# Patient Record
Sex: FEMALE | Race: ASIAN | Employment: UNEMPLOYED | URBAN - METROPOLITAN AREA
[De-identification: names, ages, dates, MRNs, and addresses within clinical notes are randomized per-mention and may not be internally consistent; named-entity substitution may affect disease eponyms.]

---

## 2019-12-18 ENCOUNTER — OFFICE VISIT (OUTPATIENT)
Dept: NEUROLOGY | Age: 29
End: 2019-12-18

## 2019-12-18 VITALS
DIASTOLIC BLOOD PRESSURE: 78 MMHG | BODY MASS INDEX: 47.2 KG/M2 | SYSTOLIC BLOOD PRESSURE: 114 MMHG | OXYGEN SATURATION: 98 % | HEART RATE: 114 BPM | HEIGHT: 61 IN | RESPIRATION RATE: 16 BRPM | WEIGHT: 250 LBS

## 2019-12-18 DIAGNOSIS — R51.9 WORSENING HEADACHES: Primary | ICD-10-CM

## 2019-12-18 DIAGNOSIS — G43.709 CHRONIC MIGRAINE W/O AURA W/O STATUS MIGRAINOSUS, NOT INTRACTABLE: ICD-10-CM

## 2019-12-18 RX ORDER — TOPIRAMATE 50 MG/1
TABLET, FILM COATED ORAL
Qty: 60 TAB | Refills: 5 | Status: SHIPPED | OUTPATIENT
Start: 2019-12-18 | End: 2020-10-16

## 2019-12-18 NOTE — PROGRESS NOTES
Ms. Velasquez presents as a new patient for evaluation of daily headaches. Depression screening done on patient.

## 2019-12-18 NOTE — PROGRESS NOTES
Chief Complaint   Patient presents with    Headache       Referred by: Dr. Hailee Hernandez      HPI    43-year-old woman here for headaches. She has had headaches for about 10 years diffuse throbbing pain occurring daily with severe escalations consisting of nausea vomiting photosensitivity. She has to go to the ER. She is taking Tylenol almost daily. Sleep is okay. No vision changes numbness or weakness. Her daughter has migraines. Review of Systems   Eyes: Positive for photophobia. Negative for double vision. Gastrointestinal: Positive for nausea. Neurological: Positive for headaches. All other systems reviewed and are negative.       Past Medical History:   Diagnosis Date    Depression     Other ill-defined conditions(799.17)     migraines    Postpartum depression     After second baby -mild- problems with mother in law- resolved     Family History   Problem Relation Age of Onset    Hypertension Paternal Grandmother      Social History     Socioeconomic History    Marital status:      Spouse name: Not on file    Number of children: Not on file    Years of education: Not on file    Highest education level: Not on file   Occupational History    Not on file   Social Needs    Financial resource strain: Not on file    Food insecurity:     Worry: Not on file     Inability: Not on file    Transportation needs:     Medical: Not on file     Non-medical: Not on file   Tobacco Use    Smoking status: Never Smoker    Smokeless tobacco: Never Used   Substance and Sexual Activity    Alcohol use: No    Drug use: No    Sexual activity: Yes     Partners: Male   Lifestyle    Physical activity:     Days per week: Not on file     Minutes per session: Not on file    Stress: Not on file   Relationships    Social connections:     Talks on phone: Not on file     Gets together: Not on file     Attends Tenriism service: Not on file     Active member of club or organization: Not on file     Attends meetings of clubs or organizations: Not on file     Relationship status: Not on file    Intimate partner violence:     Fear of current or ex partner: Not on file     Emotionally abused: Not on file     Physically abused: Not on file     Forced sexual activity: Not on file   Other Topics Concern    Not on file   Social History Narrative    Not on file     Current Outpatient Medications   Medication Sig    topiramate (TOPAMAX) 50 mg tablet 1/2 tab twice daily for 5 days then increase to 1 tab twice daily  Indications: Migraine Prevention    oxyCODONE-acetaminophen (PERCOCET) 5-325 mg per tablet Take 2 Tabs by mouth every four (4) hours as needed for Pain.  ibuprofen (MOTRIN) 600 mg tablet Take 1 Tab by mouth every six (6) hours as needed for Pain.  prenatal multivitamin 90-1-50 mg tablet Take 1 Tab by mouth daily. Indications: PREGNANCY     No current facility-administered medications for this visit. No Known Allergies      Neurologic Exam     Mental Status   Oriented to person, place, and time. Cranial Nerves   Cranial nerves II through XII intact. Motor Exam   Muscle bulk: normal    Strength   Strength 5/5 throughout. Sensory Exam   Light touch normal.     Gait, Coordination, and Reflexes     Gait  Gait: normal    Coordination   Finger to nose coordination: normal    Physical Exam   Constitutional: She is oriented to person, place, and time. She appears well-developed and well-nourished. Cardiovascular: Normal rate. Pulmonary/Chest: Effort normal.   Neurological: She is oriented to person, place, and time. She has normal strength. She has a normal Finger-Nose-Finger Test. Gait normal.   Skin: Skin is warm and dry. Psychiatric: Her behavior is normal.   Vitals reviewed.     Visit Vitals  /78   Pulse (!) 114   Resp 16   Ht 5' 1\" (1.549 m)   Wt 113.4 kg (250 lb)   SpO2 98%   BMI 47.24 kg/m²       Lab Results   Component Value Date/Time    WBC 10.2 04/26/2013 10:50 AM    HGB 12.4 04/26/2013 10:50 AM    HCT 37.6 04/26/2013 10:50 AM    PLATELET 341 (L) 73/51/5027 10:50 AM    MCV 89.3 04/26/2013 10:50 AM     Lab Results   Component Value Date/Time    PLATELET 531 (L) 59/27/0948 10:50 AM          Assessment and Plan   Diagnoses and all orders for this visit:    1. Worsening headaches  -     CT HEAD WO CONT; Future    2. Chronic migraine w/o aura w/o status migrainosus, not intractable    Other orders  -     topiramate (TOPAMAX) 50 mg tablet; 1/2 tab twice daily for 5 days then increase to 1 tab twice daily  Indications: Migraine Prevention      51-year-old woman with daily headache with migrainous features with severe escalations. No red flags on exam or in the history. However she has had headaches for 10 years daily and has never had any screening imaging. Check head CT. Start Topamax twice daily, side effects have been discussed in detail. Keep a headache log. We will see her in follow-up. I reviewed and decided to continue the current medications. This clinical note was dictated with an electronic dictation software that can make unintentional errors. If there are any questions, please contact me directly for clarification. A notice of this visit/encounter being completed has been sent electronically to the patient's PCP and/or referring provider.      2 [facility], [facility] [address]  Diplomate NATALYA

## 2019-12-18 NOTE — PATIENT INSTRUCTIONS
PRESCRIPTION REFILL POLICY [facility] Statement to Patients April 1, 2014 In an effort to ensure the large volume of patient prescription refills is processed in the most efficient and expeditious manner, we are asking our patients to assist us by calling your Pharmacy for all prescription refills, this will include also your  Mail Order Pharmacy. The pharmacy will contact our office electronically to continue the refill process. Please do not wait until the last minute to call your pharmacy. We need at least 48 hours (2days) to fill prescriptions. We also encourage you to call your pharmacy before going to  your prescription to make sure it is ready. With regard to controlled substance prescription refill requests (narcotic refills) that need to be picked up at our office, we ask your cooperation by providing us with at least 72 hours (3days) notice that you will need a refill. We will not refill narcotic prescription refill requests after 4:00pm on any weekday, Monday through Thursday, or after 2:00pm on Fridays, or on the weekends. We encourage everyone to explore another way of getting your prescription refill request processed using [employer], our patient web portal through our electronic medical record system. [employer] is an efficient and effective way to communicate your medication request directly to the office and  downloadable as an debo on your smart phone . [employer] also features a review functionality that allows you to view your medication list as well as leave messages for your physician. Are you ready to get connected? If so please review the attatched instructions or speak to any of our staff to get you set up right away! Thank you so much for your cooperation. Should you have any questions please contact our Practice Administrator. The Physicians and Staff,  [facility]

## 2019-12-18 NOTE — LETTER
12/18/19 Patient: Alf Velasquez YOB: 1990 Date of Visit: 12/18/2019 Cody Quintero Sachin Holt, [address] [facility] [address] [address] VIA Facsimile: [phone] Dear Marquise Drake. Sachin Holt MD, Thank you for referring Ms. Guillaume Velasquez to [address] [place] [address] for evaluation. My notes for this consultation are attached. If you have questions, please do not hesitate to call me. I look forward to following your patient along with you. Sincerely, 2 [facility], DO 
 
12/18/2019 Patient:  Alf Velasquez YOB: 1990 Date of Visit: 12/18/2019 Dear Marquise Drake. Sachin Holt, [address] [facility] [address] [facility] [address] VIA Facsimile: [phone] 
 : 
 
 
I was requested by Bettie Duarte MD to evaluate Ms. Nohemy Velasquez  for Chief Complaint Patient presents with  
 Headache [place] Muck I am recommending the following:  
 
Diagnoses and all orders for this visit: 
 
1. Worsening headaches -     CT HEAD WO CONT; Future 2. Chronic migraine w/o aura w/o status migrainosus, not intractable Other orders -     topiramate (TOPAMAX) 50 mg tablet; 1/2 tab twice daily for 5 days then increase to 1 tab twice daily  Indications: Migraine Prevention 
 
 
 
---------------------------------------------------------------------------------------------------------------------- Below is my encounter: Chief Complaint Patient presents with  
 Headache Referred by: Dr. Sachin Holt HPI 
 
31-year-old woman here for headaches. She has had headaches for about 10 years diffuse throbbing pain occurring daily with severe escalations consisting of nausea vomiting photosensitivity. She has to go to the ER. She is taking Tylenol almost daily. Sleep is okay. No vision changes numbness or weakness. Her daughter has migraines. Review of Systems Eyes: Positive for photophobia. Negative for double vision. Gastrointestinal: Positive for nausea. Neurological: Positive for headaches. All other systems reviewed and are negative. Past Medical History:  
Diagnosis Date  Depression  Other ill-defined conditions(119.89) migraines  Postpartum depression After second baby -mild- problems with mother in law- resolved Family History Problem Relation Age of Onset  Hypertension Paternal Grandmother Social History Socioeconomic History  Marital status:  Spouse name: Not on file  Number of children: Not on file  Years of education: Not on file  Highest education level: Not on file Occupational History  Not on file Social Needs  Financial resource strain: Not on file  Food insecurity:  
  Worry: Not on file Inability: Not on file  Transportation needs:  
  Medical: Not on file Non-medical: Not on file Tobacco Use  Smoking status: Never Smoker  Smokeless tobacco: Never Used Substance and Sexual Activity  Alcohol use: No  
 Drug use: No  
 Sexual activity: Yes  
  Partners: Male Lifestyle  Physical activity:  
  Days per week: Not on file Minutes per session: Not on file  Stress: Not on file Relationships  Social connections:  
  Talks on phone: Not on file Gets together: Not on file Attends Holiness service: Not on file Active member of club or organization: Not on file Attends meetings of clubs or organizations: Not on file Relationship status: Not on file  Intimate partner violence:  
  Fear of current or ex partner: Not on file Emotionally abused: Not on file Physically abused: Not on file Forced sexual activity: Not on file Other Topics Concern  Not on file Social History Narrative  Not on file Current Outpatient Medications Medication Sig  
  topiramate (TOPAMAX) 50 mg tablet 1/2 tab twice daily for 5 days then increase to 1 tab twice daily  Indications: Migraine Prevention  oxyCODONE-acetaminophen (PERCOCET) 5-325 mg per tablet Take 2 Tabs by mouth every four (4) hours as needed for Pain.  ibuprofen (MOTRIN) 600 mg tablet Take 1 Tab by mouth every six (6) hours as needed for Pain.  prenatal multivitamin 90-1-50 mg tablet Take 1 Tab by mouth daily. Indications: PREGNANCY No current facility-administered medications for this visit. No Known Allergies Neurologic Exam  
 
Mental Status Oriented to person, place, and time. Cranial Nerves Cranial nerves II through XII intact. Motor Exam  
Muscle bulk: normal 
 
Strength Strength 5/5 throughout. Sensory Exam  
Light touch normal.  
 
Gait, Coordination, and Reflexes Gait Gait: normal 
 
Coordination Finger to nose coordination: normal 
 
Physical Exam  
Constitutional: She is oriented to person, place, and time. She appears well-developed and well-nourished. Cardiovascular: Normal rate. Pulmonary/Chest: Effort normal.  
Neurological: She is oriented to person, place, and time. She has normal strength. She has a normal Finger-Nose-Finger Test. Gait normal.  
Skin: Skin is warm and dry. Psychiatric: Her behavior is normal.  
Vitals reviewed. Visit Vitals /78 Pulse (!) 114 Resp 16 Ht 5' 1\" (1.549 m) Wt 113.4 kg (250 lb) SpO2 98% BMI 47.24 kg/m² Lab Results Component Value Date/Time WBC 10.2 04/26/2013 10:50 AM  
 HGB 12.4 04/26/2013 10:50 AM  
 HCT 37.6 04/26/2013 10:50 AM  
 PLATELET 009 (L) 42/23/7147 10:50 AM  
 MCV 89.3 04/26/2013 10:50 AM  
 
Lab Results Component Value Date/Time PLATELET 004 (L) 58/99/7836 10:50 AM  
  
 
 
Assessment and Plan Diagnoses and all orders for this visit: 
 
1. Worsening headaches -     CT HEAD WO CONT; Future 2. Chronic migraine w/o aura w/o status migrainosus, not intractable Other orders -     topiramate (TOPAMAX) 50 mg tablet; 1/2 tab twice daily for 5 days then increase to 1 tab twice daily  Indications: Migraine Prevention 49-year-old woman with daily headache with migrainous features with severe escalations. No red flags on exam or in the history. However she has had headaches for 10 years daily and has never had any screening imaging. Check head CT. Start Topamax twice daily, side effects have been discussed in detail. Keep a headache log. We will see her in follow-up. I reviewed and decided to continue the current medications. This clinical note was dictated with an electronic dictation software that can make unintentional errors. If there are any questions, please contact me directly for clarification. Thank you for giving me the opportunity to assist in the care of Ms. Bebeto Velaqsuez. If you have questions, please do not hesitate to contact me. Sincerely, [address] [facility], DO Neurologist 
Brain Injury Medicine Diplomate NATALYA

## 2019-12-26 ENCOUNTER — HOSPITAL ENCOUNTER (OUTPATIENT)
Dept: CT IMAGING | Age: 29
Discharge: HOME | End: 2019-12-26
Payer: COMMERCIAL

## 2019-12-26 DIAGNOSIS — R51.9 WORSENING HEADACHES: ICD-10-CM

## 2019-12-26 PROCEDURE — 70450 CT HEAD/BRAIN W/O DYE: CPT

## 2019-12-30 ENCOUNTER — TELEPHONE (OUTPATIENT)
Dept: NEUROLOGY | Age: 29
End: 2019-12-30

## 2019-12-30 NOTE — TELEPHONE ENCOUNTER
----- Message from Arron Yen sent at 12/30/2019  8:24 AM EST -----  Regarding: Dr Glenis Aase  Pt's  Jon Ballard is calling to see if it is ok for her to take her pain medicine, she has taken 2 Aleve and 2 Tylenol this morning already, please call pt at [phone]

## 2019-12-30 NOTE — TELEPHONE ENCOUNTER
Conferred with Dr. Mandie Lees.  Pt is to be taking her topamax BID (not prn). She will take her topamax tonight. (Not to take other pain relievers first), then again in the morning, and will stay on that regime moving forward.

## 2020-01-06 NOTE — TELEPHONE ENCOUNTER
----- Message from Kalyan Kerr sent at 1/6/2020 11:16 AM EST -----  Regarding: Dr. Sally Vaughn Message/Vendor Calls    Caller's first and last name: Chan Lebron- savannah      Reason for call: Inna Jones states pt says medicine \"generic Topamax 50 mg\" is not working.        Callback required yes/no and why: yes      Best contact number(s): [phone]      Details to clarify the request:      Kalyan Kerr

## 2020-01-06 NOTE — TELEPHONE ENCOUNTER
Pt's  calling back about cat scan that was taken at [facility], about 3 weeks ago. Pt is wanting the results.

## 2020-01-07 ENCOUNTER — TELEPHONE (OUTPATIENT)
Dept: NEUROLOGY | Age: 30
End: 2020-01-07

## 2020-01-07 NOTE — TELEPHONE ENCOUNTER
I spoke with pt. She has been on the 50mg BID for a week. She has no relief and has had a daily H/A for the past 2 weeks. She is also still taking Advil and Tylenol every day. Explained she needs to not continue with those OTC meds. She is asking for something to end this headache please.

## 2020-01-07 NOTE — TELEPHONE ENCOUNTER
----- Message from Mireya Brewer sent at 1/7/2020 12:44 PM EST -----  Regarding: Dr Gaspar/telephone  Patient return call    Caller's first and last name and relationship (if not the patient): Blake Velasquez, pt's       Best contact number(s):[phone]      Whose call is being returned:  nurse    Details to clarify the request:regarding questions he left on yesterday regarding her CT and mediations      Mireya Brewer

## 2020-01-08 RX ORDER — METHYLPREDNISOLONE 4 MG/1
TABLET ORAL
Qty: 1 DOSE PACK | Refills: 0 | Status: SHIPPED | OUTPATIENT
Start: 2020-01-08 | End: 2020-10-16

## 2020-01-08 RX ORDER — PROCHLORPERAZINE MALEATE 5 MG
TABLET ORAL
Qty: 10 TAB | Refills: 0 | Status: SHIPPED | OUTPATIENT
Start: 2020-01-08 | End: 2020-08-20 | Stop reason: ALTCHOICE

## 2020-02-07 ENCOUNTER — TELEPHONE (OUTPATIENT)
Dept: NEUROLOGY | Age: 30
End: 2020-02-07

## 2020-02-07 NOTE — TELEPHONE ENCOUNTER
----- Message from Cindy Navarrete sent at 2/7/2020  3:10 PM EST -----  Regarding: Dr. Kyle Sweeney Message/Vendor Calls    Caller's first and last name: Blake-      Reason for call: PRN medication for migraines is not working.        Callback required yes/no and why: yes      Best contact number(s): [phone]      Details to clarify the request:      Cindy Navarrete

## 2020-02-07 NOTE — TELEPHONE ENCOUNTER
The compazine/benadryl not helping with her headaches. Please advise if there is something else.       LOV  12/18/19  Pending  3/26/20

## 2020-02-10 RX ORDER — AMITRIPTYLINE HYDROCHLORIDE 25 MG/1
TABLET, FILM COATED ORAL
Qty: 30 TAB | Refills: 5 | Status: SHIPPED | OUTPATIENT
Start: 2020-02-10 | End: 2020-10-16

## 2020-02-10 NOTE — TELEPHONE ENCOUNTER
Okay  She already did a round of steroids. Amitriptyline at bedtime with the Topamax.     If still unrelenting may need to see UC for IV meds

## 2020-02-11 NOTE — TELEPHONE ENCOUNTER
----- Message from Eunice Howellpe sent at 2/11/2020  8:34 AM EST -----  Regarding: Dr. Arleth Wright returning a call regarding his wife.  Contact is [phone]

## 2020-08-20 DIAGNOSIS — G43.709 CHRONIC MIGRAINE W/O AURA W/O STATUS MIGRAINOSUS, NOT INTRACTABLE: Primary | ICD-10-CM

## 2020-08-20 RX ORDER — SUMATRIPTAN 50 MG/1
50 TABLET, FILM COATED ORAL
Qty: 9 TAB | Refills: 0 | Status: SHIPPED | OUTPATIENT
Start: 2020-08-20 | End: 2020-08-20

## 2020-08-20 RX ORDER — PROMETHAZINE HYDROCHLORIDE 12.5 MG/1
12.5 TABLET ORAL
Qty: 15 TAB | Refills: 0 | Status: SHIPPED | OUTPATIENT
Start: 2020-08-20 | End: 2020-10-16

## 2020-08-21 ENCOUNTER — TELEPHONE (OUTPATIENT)
Dept: NEUROLOGY | Facility: CLINIC | Age: 30
End: 2020-08-21

## 2020-08-21 NOTE — TELEPHONE ENCOUNTER
Hello Message    \"Pls cl. I spoke with the on-call earlier but no Rx has been called in.   I'm at the pharmacy now\"    Second Hello Message    \"wife having a bad headache and need to know if there is something else that she can take or do besides the medication she is taking\"

## 2020-08-21 NOTE — TELEPHONE ENCOUNTER
Patient called, verified  with , on HIPPA, advised of Dr. Monica Hernandez recommendation. He stated they would call back to schedule a follow up with her.

## 2020-08-26 ENCOUNTER — TELEPHONE (OUTPATIENT)
Dept: NEUROLOGY | Facility: CLINIC | Age: 30
End: 2020-08-26

## 2020-08-26 NOTE — TELEPHONE ENCOUNTER
Pt's  calling wanting to switch providers, pt would like to see Dr Sonia Langston, states his wife felt rushed, and would like a provider that will listen to her. Please advise.

## 2020-10-16 ENCOUNTER — OFFICE VISIT (OUTPATIENT)
Dept: NEUROLOGY | Facility: CLINIC | Age: 30
End: 2020-10-16
Payer: COMMERCIAL

## 2020-10-16 VITALS
BODY MASS INDEX: 46.44 KG/M2 | RESPIRATION RATE: 17 BRPM | WEIGHT: 246 LBS | DIASTOLIC BLOOD PRESSURE: 70 MMHG | HEIGHT: 61 IN | SYSTOLIC BLOOD PRESSURE: 120 MMHG | OXYGEN SATURATION: 98 % | HEART RATE: 76 BPM

## 2020-10-16 DIAGNOSIS — M54.2 CERVICALGIA: ICD-10-CM

## 2020-10-16 DIAGNOSIS — G43.009 MIGRAINE WITHOUT AURA AND WITHOUT STATUS MIGRAINOSUS, NOT INTRACTABLE: ICD-10-CM

## 2020-10-16 DIAGNOSIS — Z91.89 AT RISK FOR OBSTRUCTIVE SLEEP APNEA: ICD-10-CM

## 2020-10-16 DIAGNOSIS — G44.40 REBOUND HEADACHE: Primary | ICD-10-CM

## 2020-10-16 DIAGNOSIS — R06.83 SNORING: ICD-10-CM

## 2020-10-16 PROCEDURE — 99215 OFFICE O/P EST HI 40 MIN: CPT | Performed by: PSYCHIATRY & NEUROLOGY

## 2020-10-16 RX ORDER — AMITRIPTYLINE HYDROCHLORIDE 10 MG/1
10 TABLET, FILM COATED ORAL
Qty: 90 TAB | Refills: 1 | Status: SHIPPED | OUTPATIENT
Start: 2020-10-16

## 2020-10-16 NOTE — PROGRESS NOTES
Neurology Consult Note      HISTORY PROVIDED BY: patient    Chief Complaint:   Chief Complaint   Patient presents with    Headache      Subjective:    Joshua Velasquez is a 27 y.o. right handed female initially and last seen in the clinic by Dr. Siena Sow on 12/18/19 reporting a 10y h/o headaches, global throbbing daily HA with N/V/Photophobia. She was started on Topamax 50mg bid. [facility] 12/26/19 was normal.      She returns for f/u. This is my first visit with the pt. Pt reports pain in neck that radiates up and at other times the pain is global and feels like something is hitting her head. The second type is more severe associated with N/V/P/P. HAs are almost daily. She states she \"lives on tylenol and ibuprofen\" together daily up to every 3 hours. +Family h/o HAs in sister and brother. Not drinking enough water, doesn't keep up with it. No daily caffeine. She sleeps well, +snores, denies apneas, but wakes feeling like she is choking nightly. She stopped the Topamax b/c she tingling in her fingers, only took it for a few weeks. Previous testing:  [facility] 12/26/19 was normal    Past Medical History:   Diagnosis Date    Depression     Postpartum depression     After second baby -mild- problems with mother in law- resolved      History reviewed. No pertinent surgical history.    Social History     Socioeconomic History    Marital status:      Spouse name: Not on file    Number of children: Not on file    Years of education: Not on file    Highest education level: Not on file   Occupational History    Occupation: Doesn't work outside her home   Social Needs    Financial resource strain: Not on file    Food insecurity     Worry: Not on file     Inability: Not on file   [employer] needs     Medical: Not on file     Non-medical: Not on file   Tobacco Use    Smoking status: Never Smoker    Smokeless tobacco: Never Used   Substance and Sexual Activity    Alcohol use: No    Drug use: No    Sexual activity: Yes     Partners: Male   Lifestyle    Physical activity     Days per week: Not on file     Minutes per session: Not on file    Stress: Not on file   Relationships    Social connections     Talks on phone: Not on file     Gets together: Not on file     Attends [employer] service: Not on file     Active member of club or organization: Not on file     Attends meetings of clubs or organizations: Not on file     Relationship status: Not on file    Intimate partner violence     Fear of current or ex partner: Not on file     Emotionally abused: Not on file     Physically abused: Not on file     Forced sexual activity: Not on file   Other Topics Concern    Not on file   Social History Narrative    Lives in [place] with family     Family History   Problem Relation Age of Onset    Hypertension Paternal Grandmother     Heart Disease Mother     Hypertension Mother     Diabetes Mother     No Known Problems Father     Migraines Sister     Migraines Brother     No Known Problems Sister     No Known Problems Daughter     No Known Problems Daughter     No Known Problems Daughter     No Known Problems Daughter          Objective:   Review of Systems   All other systems reviewed and are negative. No Known Allergies     Meds:  Outpatient Medications Prior to Visit   Medication Sig Dispense Refill    ibuprofen (MOTRIN) 600 mg tablet Take 1 Tab by mouth every six (6) hours as needed for Pain. (Patient taking differently: Take 800 mg by mouth every six (6) hours as needed for Pain.) 30 Tab 0    promethazine (PHENERGAN) 12.5 mg tablet Take 1 Tab by mouth two (2) times daily as needed for Nausea.  15 Tab 0    amitriptyline (ELAVIL) 25 mg tablet 1/2 tab at bedtime, increase to 1 tab after 5 nights 30 Tab 5    methylPREDNISolone (MEDROL DOSEPACK) 4 mg tablet Per package directions 1 Dose Pack 0    topiramate (TOPAMAX) 50 mg tablet 1/2 tab twice daily for 5 days then increase to 1 tab twice daily Indications: Migraine Prevention 60 Tab 5    oxyCODONE-acetaminophen (PERCOCET) 5-325 mg per tablet Take 2 Tabs by mouth every four (4) hours as needed for Pain. 15 Tab 0    prenatal multivitamin 90-1-50 mg tablet Take 1 Tab by mouth daily. Indications: PREGNANCY       No facility-administered medications prior to visit. Imaging:  MRI Results (most recent):  No results found for this or any previous visit. CT Results (most recent):  Results from Hospital Encounter encounter on 12/26/19   CT HEAD WO CONT    Narrative EXAM: CT HEAD WO CONT    INDICATION: 29F with daily headache    COMPARISON: None. CONTRAST: None. TECHNIQUE: Unenhanced CT of the head was performed using 5 mm images. Brain and  bone windows were generated. CT dose reduction was achieved through use of a  standardized protocol tailored for this examination and automatic exposure  control for dose modulation. FINDINGS:  No calvarial abnormalities are detected. A small dome-shaped soft tissue density  is noted in the medial aspect of the left side of the sphenoid sinus. This is  compatible with a mucous retention cyst/polyp. There is deviation of the  midportion of the nasal septum towards the left and a bone bar is present (see  bone window axial image 2). There is no evidence of intracranial hemorrhage. Impression IMPRESSION:  1. No evidence of intracranial hemorrhage. 2. Evidence of minimal left-sided sphenoid sinus disease. 3. Nasal septal deviation as described above. 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         Reviewed records in [email or web address] tab today    Lab Review   Results for orders placed or performed during the hospital encounter of 10/11/14   HCG URINE, QL. - POC   Result Value Ref Range    Pregnancy test,urine (POC) NEGATIVE  NEG          Exam:  Visit Vitals  /70 (BP 1 Location: Left arm, BP Patient Position: Sitting)   Pulse 76   Resp 17   Ht 5' 1\" (1.549 m)   Wt 111.6 kg (246 lb)   SpO2 98% BMI 46.48 kg/m²     General:  Alert, cooperative, no distress. Head:  Normocephalic, without obvious abnormality, atraumatic. Respiratory:  Heart:   Non labored breathing  Regular rate and rhythm, no murmurs   Neck:   2+ carotids, no bruits   Extremities: Warm, no cyanosis or edema. Pulses: 2+ radial pulses. Neurologic:  MS: Alert and oriented x 4, speech intact. Language intact. Attention and fund of knowledge appropriate. Recent and remote memory intact. Cranial Nerves:  II: visual fields Full to confrontation   II: pupils Equal, round, reactive to light   II: optic disc No papilledema   III,VII: ptosis none   III,IV,VI: extraocular muscles  EOMI, no nystagmus or diplopia   V: facial light touch sensation  normal   VII: facial muscle function   symmetric   VIII: hearing intact   IX: soft palate elevation     XI: trapezius strength     XI: sternocleidomastoid strength    XII: tongue       Motor: normal bulk and tone, no tremor              Strength: 5/5 throughout, no PD  Sensory: intact to LT, PP  Coordination: FTN and HTS intact, VICKIE intact  Gait: normal gait, able to tandem walk  Reflexes: 2+ symmetric, toes downgoing       Assessment/Plan   Pt is a 27 y.o. right handed female with 10y h/o headaches, pain in neck that radiates up and at other times is global, feels like something is hitting her head and more severe,+ N/V/P/P. HAs are almost daily, taking daily around-the-clock OTC pain meds. Exam with BMI 46.5, o/w non-focal and unremarkable. Headache history is consistent with migraine headache without aura and rebound headache from daily analgesics. Pathophysiology of rebound headache discussed with the patient, recommend stopping all pain medications understanding headaches will worsen for up to 2 weeks before gradually improving. Recommend starting a migraine headache prevention medication, amitriptyline 10 mg nightly, side effects reviewed.   Patient is encouraged to increase water intake to 64 ounces a day. Referral to physical therapy for neck pain triggering headaches. Recommend sleep evaluation for ALYCIA given BMI, history of snoring, and waking choking nightly. Patient is asked to keep a headache calendar and bring this to her follow-up appointment in 3 months, instructed to call in the interim with any questions or concerns. ICD-10-CM ICD-9-CM    1. Rebound headache  G44.40 339.3    2. Migraine without aura and without status migrainosus, not intractable  G43.009 346.10 REFERRAL TO PHYSICAL THERAPY   3. Cervicalgia  M54.2 723.1 REFERRAL TO PHYSICAL THERAPY   4. At risk for obstructive sleep apnea  Z91.89 V49.89 REFERRAL TO SLEEP STUDIES   5. Snoring  R06.83 786.09 REFERRAL TO SLEEP STUDIES       Signed:   Ivonne Patel MD  10/16/2020

## 2020-10-16 NOTE — LETTER
11/4/20 Patient: Chris Velasquez YOB: 1990 Date of Visit: 10/16/2020 Dev Moore Manju Ruvalcaba, [address] [place] [address] 300 [facility] 7 [address] VIA Facsimile: [phone] Dear Sonali Sanchez. Manju Ruvalcaba MD, Thank you for referring Ms. Guillaume Velasquez to [address] [place] [address] for evaluation. My notes for this consultation are attached. If you have questions, please do not hesitate to call me. I look forward to following your patient along with you. Sincerely, Neal Armstrong MD

## 2020-11-12 ENCOUNTER — OFFICE VISIT (OUTPATIENT)
Dept: SLEEP MEDICINE | Age: 30
End: 2020-11-12
Payer: COMMERCIAL

## 2020-11-12 ENCOUNTER — TELEPHONE (OUTPATIENT)
Dept: SLEEP MEDICINE | Age: 30
End: 2020-11-12

## 2020-11-12 VITALS
SYSTOLIC BLOOD PRESSURE: 113 MMHG | DIASTOLIC BLOOD PRESSURE: 73 MMHG | BODY MASS INDEX: 51.53 KG/M2 | HEIGHT: 62 IN | OXYGEN SATURATION: 96 % | WEIGHT: 280 LBS | HEART RATE: 88 BPM

## 2020-11-12 DIAGNOSIS — G47.33 OSA (OBSTRUCTIVE SLEEP APNEA): Primary | ICD-10-CM

## 2020-11-12 DIAGNOSIS — E66.01 OBESITY, MORBID (HCC): ICD-10-CM

## 2020-11-12 PROCEDURE — 99204 OFFICE O/P NEW MOD 45 MIN: CPT | Performed by: SPECIALIST

## 2020-11-12 NOTE — PROGRESS NOTES
[address] [facility] [place]. [place], [address]  Tel.  [phone]  Fax. 100 [facility] 60  [place], [address] [facility]  Tel.  [phone]  Fax. [phone] [address]   Tel.  [phone]  Fax. [phone]       Chief Complaint       No chief complaint on file. HPI      Jolene Velasquez is 27 y.o. female seen for evaluation of a sleep disorder. Patient has a long history of headaches. She notes headaches which are essentially daily. She notes that headaches are frequently upon awakening. She relates a variable sleep schedule. She may not retire until 2-3 AM and will often awaken between 9-11 AM.  She notes that she may be fatigued on awakening and during the day. She does note episodes of frequent dreaming. She denies excessive daytime fatigue. He denies sleep talking or sleepwalking, bruxism or nocturnal incontinence, abnormal arm or leg movements, hypnagogic hallucinations, sleep paralysis or cataplexy. The patient has not undergone diagnostic testing for the current problems. No Known Allergies    Current Outpatient Medications   Medication Sig Dispense Refill    amitriptyline (ELAVIL) 10 mg tablet Take 1 Tab by mouth nightly. 90 Tab 1    ibuprofen (MOTRIN) 600 mg tablet Take 1 Tab by mouth every six (6) hours as needed for Pain. (Patient taking differently: Take 800 mg by mouth every six (6) hours as needed for Pain.) 30 Tab 0        She  has a past medical history of Depression and Postpartum depression.  She also has no past medical history of Abnormal Pap smear, Acquired hypothyroidism, Anemia NEC, Asthma, Complication of anesthesia, Diabetes mellitus, Essential hypertension, Genital herpes, unspecified, Heart abnormalities, Herpes gestationis, Herpes simplex without mention of complication, Human immunodeficiency virus (HIV) disease ([facility] [address] OTHER MEDICAL, Infertility, Kidney disease, Liver disease, Phlebitis and thrombophlebitis of unspecified site, Psychiatric problem, Rhesus isoimmunization unspecified as to episode of care in pregnancy, Sickle-cell disease, unspecified, Systemic lupus erythematosus ([address] Trauma, Unspecified breast disorder, Unspecified diseases of blood and blood-forming organs, or Unspecified epilepsy without mention of intractable epilepsy. She  has no past surgical history on file. She family history includes Diabetes in her mother; Heart Disease in her mother; Hypertension in her mother and paternal grandmother; Migraines in her brother and sister; No Known Problems in her daughter, daughter, daughter, daughter, father, and sister. She  reports that she has never smoked. She has never used smokeless tobacco. She reports that she does not drink alcohol or use drugs. Review of Systems:  Review of Systems   Constitutional: Negative for chills and fever. HENT: Negative for hearing loss and tinnitus. Eyes: Negative for blurred vision and double vision. Respiratory: Negative for cough and shortness of breath. Cardiovascular: Negative for chest pain and palpitations. Gastrointestinal: Negative for abdominal pain and heartburn. Genitourinary: Negative for frequency and urgency. Musculoskeletal: Positive for back pain and neck pain. Skin: Negative for itching and rash. Neurological: Positive for headaches. Psychiatric/Behavioral: Negative for depression. The patient is not nervous/anxious. Objective:     Visit Vitals  /73 (BP 1 Location: Left arm, BP Patient Position: Sitting)   Pulse 88   Ht 5' 2\" (1.575 m)   Wt 280 lb (127 kg)   SpO2 96%   BMI 51.21 kg/m²     Body mass index is 51.21 kg/m². General:   Conversant, cooperative   Eyes:  Pupils equal and reactive, no nystagmus   Oropharynx:   Mallampati score II, tongue normal       Neck:   No carotid bruits;      Chest/Lungs:  Clear on auscultation    CVS:  Normal rate, regular rhythm   Skin: Warm to touch; no obvious rashes   Neuro:  Speech fluent, face symmetrical, tongue movement normal   Psych:  Normal affect,  normal countenance        Assessment:       ICD-10-CM ICD-9-CM    1. ALYCIA (obstructive sleep apnea)  G47.33 327.23 SLEEP STUDY UNATTENDED, 4 CHANNEL   2. Obesity, morbid ([facility]  E66.01 278.01 SLEEP STUDY UNATTENDED, 4 CHANNEL           Plan:     Orders Placed This Encounter    SLEEP STUDY UNATTENDED, 4 CHANNEL     Order Specific Question:   Reason for Exam     Answer:   fatigeu, headache       * Patient has a history and examination consistent with the diagnosis of sleep apnea. * Sleep testing was ordered for initial evaluation. * She was provided information on sleep apnea including corresponding risk factors and the importance of proper treatment. * Treatment options if indicated were reviewed today. Instructions:    o The patient would benefit from weight reduction measures. o Do not engage in activities requiring a normal degree of alertness if fatigue is present. o The patient understands that untreated or undertreated sleep apnea could impair judgement and the ability to function normally during the day.  o Call or return if symptoms worsen or persist.          Xin Wiggins MD, FAA  Electronically signed 11/12/20       This note was created using voice recognition software. Despite editing, there may be syntax errors. This note will not be viewable in [address]

## 2020-11-12 NOTE — PATIENT INSTRUCTIONS
Learning About Sleep Apnea  What is it? Sleep apnea means that breathing stops for short periods during sleep. When you stop breathing or have reduced airflow into your lungs during sleep, you don't sleep well and you can be very tired during the day. The oxygen levels in your blood may go down, and carbon dioxide levels go up. It may lead to other problems, such as high blood pressure and heart disease. Sleep apnea can range from mild to severe, based on how often breathing stops during sleep. Breathing may stop as few as 5 times an hour (mild apnea) to 30 or more times an hour (severe apnea). Obstructive sleep apnea is the most common type. This most often occurs because your airways are blocked or partly blocked. Central sleep apnea is less common. It happens when the brain has trouble controlling breathing. Some people have both types. That's called complex sleep apnea. What are the symptoms? There are symptoms of sleep apnea that you may notice and symptoms that others may notice when you're asleep. Symptoms you may notice include:  · Feeling extremely sleepy during the day. · Feeling unrefreshed or tired after a night's sleep. · Problems with memory and concentration, or mood changes. · Morning or night headaches. · Heartburn or a sour taste in your mouth at night. · Swelling of the legs. · Getting up often during the night to urinate. · A dry mouth or sore throat in the morning. Your bed partner may notice that you:  · Have episodes of not breathing. · Snore loudly. Almost all people who have sleep apnea snore. But not all people who snore have sleep apnea. · Toss and turn during sleep. · Have nighttime choking or gasping spells. How is it diagnosed? Your doctor will probably do a physical exam and ask about your past health. He or she may also ask you or your bed partner about your snoring and sleep behavior and how tired you feel during the day.   Your doctor may suggest a sleep study. Sleep studies are a series of tests that look at what happens to the body during sleep. They check for how often you stop breathing or have too little air flowing into your lungs during sleep. They also find out how much oxygen you have in your blood during sleep. A sleep study may take place in your home. Or it might take place at a sleep center, where you will spend the night. How is it treated? Sleep apnea is often treated with devices that deliver air through a mask to help keep your airways open. These include:  · Continuous positive airway pressure (CPAP). This increases air pressure in your throat. It keeps your airway open when you breathe in. It's the most common device. · Bilevel positive airway pressure (BiPAP). This uses different air pressures when you breathe in and out. · Adaptive servo ventilation (ASV). It senses pauses in breathing and adjusts air pressure. It's mostly used for central sleep apnea. If your tonsils or other tissues are blocking your airway, your doctor may suggest surgery to open the airway. How can you care for yourself? You may be able to treat mild sleep apnea by making changes in how you live and the way you sleep. For example, it may help to:  · Lose weight if you are overweight. · Sleep on your side, not your back. · Avoid alcohol and medicines such as sedatives before bed. You may also try an oral breathing device. It helps keep your airways open while you sleep. Where can you learn more? Go to http://brandon-hoda.info/  Enter S121 in the search box to learn more about \"Learning About Sleep Apnea. \"  Current as of: February 24, 2020               Content Version: 12.6  © 7717-7731 [employer], [employer]. Care instructions adapted under license by [employer] (which disclaims liability or warranty for this information).  If you have questions about a medical condition or this instruction, always ask your healthcare professional. [address] any warranty or liability for your use of this information.

## 2020-11-12 NOTE — TELEPHONE ENCOUNTER
Patient seen today at our [place] office for her initial evaluation. HSAT recommended. Office to obtain PA and patient prefers to pickup device from [facility] as she lives closer to that location.

## 2020-11-13 ENCOUNTER — DOCUMENTATION ONLY (OUTPATIENT)
Dept: SLEEP MEDICINE | Age: 30
End: 2020-11-13

## 2020-11-20 ENCOUNTER — DOCUMENTATION ONLY (OUTPATIENT)
Dept: SLEEP MEDICINE | Age: 30
End: 2020-11-20

## 2021-04-05 ENCOUNTER — HOSPITAL ENCOUNTER (EMERGENCY)
Age: 31
Discharge: HOME | End: 2021-04-05
Attending: EMERGENCY MEDICINE
Payer: COMMERCIAL

## 2021-04-05 ENCOUNTER — APPOINTMENT (OUTPATIENT)
Dept: GENERAL RADIOLOGY | Age: 31
End: 2021-04-05
Attending: NURSE PRACTITIONER
Payer: COMMERCIAL

## 2021-04-05 VITALS
SYSTOLIC BLOOD PRESSURE: 123 MMHG | RESPIRATION RATE: 16 BRPM | WEIGHT: 287.92 LBS | TEMPERATURE: 98.6 F | HEART RATE: 89 BPM | DIASTOLIC BLOOD PRESSURE: 70 MMHG | OXYGEN SATURATION: 97 % | BODY MASS INDEX: 52.98 KG/M2 | HEIGHT: 62 IN

## 2021-04-05 DIAGNOSIS — R07.9 CHEST PAIN, UNSPECIFIED TYPE: Primary | ICD-10-CM

## 2021-04-05 DIAGNOSIS — M54.6 ACUTE RIGHT-SIDED THORACIC BACK PAIN: ICD-10-CM

## 2021-04-05 LAB
ALBUMIN SERPL-MCNC: 3.6 G/DL (ref 3.5–5)
ALBUMIN/GLOB SERPL: 1 {RATIO} (ref 1.1–2.2)
ALP SERPL-CCNC: 48 U/L (ref 45–117)
ALT SERPL-CCNC: 33 U/L (ref 12–78)
ANION GAP SERPL CALC-SCNC: 12 MMOL/L (ref 5–15)
APPEARANCE UR: CLEAR
AST SERPL-CCNC: 16 U/L (ref 15–37)
BACTERIA URNS QL MICRO: NEGATIVE /HPF
BASOPHILS # BLD: 0 K/UL (ref 0–0.1)
BASOPHILS NFR BLD: 0 % (ref 0–1)
BILIRUB SERPL-MCNC: 0.4 MG/DL (ref 0.2–1)
BILIRUB UR QL: NEGATIVE
BUN SERPL-MCNC: 11 MG/DL (ref 6–20)
BUN/CREAT SERPL: 15 (ref 12–20)
CALCIUM SERPL-MCNC: 9.2 MG/DL (ref 8.5–10.1)
CHLORIDE SERPL-SCNC: 102 MMOL/L (ref 97–108)
CO2 SERPL-SCNC: 25 MMOL/L (ref 21–32)
COLOR UR: ABNORMAL
CREAT SERPL-MCNC: 0.72 MG/DL (ref 0.55–1.02)
D DIMER PPP FEU-MCNC: 0.24 MG/L FEU (ref 0–0.65)
DIFFERENTIAL METHOD BLD: NORMAL
EOSINOPHIL # BLD: 0 K/UL (ref 0–0.4)
EOSINOPHIL NFR BLD: 0 % (ref 0–7)
EPITH CASTS URNS QL MICRO: ABNORMAL /LPF
ERYTHROCYTE [DISTWIDTH] IN BLOOD BY AUTOMATED COUNT: 12.3 % (ref 11.5–14.5)
GLOBULIN SER CALC-MCNC: 3.7 G/DL (ref 2–4)
GLUCOSE SERPL-MCNC: 82 MG/DL (ref 65–100)
GLUCOSE UR STRIP.AUTO-MCNC: NEGATIVE MG/DL
HCG UR QL: NEGATIVE
HCT VFR BLD AUTO: 37.8 % (ref 35–47)
HGB BLD-MCNC: 12.1 G/DL (ref 11.5–16)
HGB UR QL STRIP: ABNORMAL
IMM GRANULOCYTES # BLD AUTO: 0 K/UL (ref 0–0.04)
IMM GRANULOCYTES NFR BLD AUTO: 0 % (ref 0–0.5)
KETONES UR QL STRIP.AUTO: NEGATIVE MG/DL
LEUKOCYTE ESTERASE UR QL STRIP.AUTO: NEGATIVE
LYMPHOCYTES # BLD: 3.4 K/UL (ref 0.8–3.5)
LYMPHOCYTES NFR BLD: 37 % (ref 12–49)
MCH RBC QN AUTO: 28.7 PG (ref 26–34)
MCHC RBC AUTO-ENTMCNC: 32 G/DL (ref 30–36.5)
MCV RBC AUTO: 89.8 FL (ref 80–99)
MONOCYTES # BLD: 0.5 K/UL (ref 0–1)
MONOCYTES NFR BLD: 6 % (ref 5–13)
NEUTS SEG # BLD: 5.2 K/UL (ref 1.8–8)
NEUTS SEG NFR BLD: 57 % (ref 32–75)
NITRITE UR QL STRIP.AUTO: NEGATIVE
NRBC # BLD: 0 K/UL (ref 0–0.01)
NRBC BLD-RTO: 0 PER 100 WBC
PH UR STRIP: 6 [PH] (ref 5–8)
PLATELET # BLD AUTO: 231 K/UL (ref 150–400)
PMV BLD AUTO: 10.5 FL (ref 8.9–12.9)
POTASSIUM SERPL-SCNC: 3.8 MMOL/L (ref 3.5–5.1)
PROT SERPL-MCNC: 7.3 G/DL (ref 6.4–8.2)
PROT UR STRIP-MCNC: NEGATIVE MG/DL
RBC # BLD AUTO: 4.21 M/UL (ref 3.8–5.2)
RBC #/AREA URNS HPF: ABNORMAL /HPF (ref 0–5)
SODIUM SERPL-SCNC: 139 MMOL/L (ref 136–145)
SP GR UR REFRACTOMETRY: >1.03 (ref 1–1.03)
TROPONIN I SERPL-MCNC: <0.05 NG/ML
UR CULT HOLD, URHOLD: NORMAL
UROBILINOGEN UR QL STRIP.AUTO: 0.2 EU/DL (ref 0.2–1)
WBC # BLD AUTO: 9.3 K/UL (ref 3.6–11)
WBC URNS QL MICRO: ABNORMAL /HPF (ref 0–4)

## 2021-04-05 PROCEDURE — 93005 ELECTROCARDIOGRAM TRACING: CPT

## 2021-04-05 PROCEDURE — 85379 FIBRIN DEGRADATION QUANT: CPT

## 2021-04-05 PROCEDURE — 80053 COMPREHEN METABOLIC PANEL: CPT

## 2021-04-05 PROCEDURE — 81025 URINE PREGNANCY TEST: CPT

## 2021-04-05 PROCEDURE — 96374 THER/PROPH/DIAG INJ IV PUSH: CPT

## 2021-04-05 PROCEDURE — 71046 X-RAY EXAM CHEST 2 VIEWS: CPT

## 2021-04-05 PROCEDURE — 84484 ASSAY OF TROPONIN QUANT: CPT

## 2021-04-05 PROCEDURE — 85025 COMPLETE CBC W/AUTO DIFF WBC: CPT

## 2021-04-05 PROCEDURE — 81001 URINALYSIS AUTO W/SCOPE: CPT

## 2021-04-05 PROCEDURE — 99285 EMERGENCY DEPT VISIT HI MDM: CPT

## 2021-04-05 PROCEDURE — 74011250637 HC RX REV CODE- 250/637: Performed by: NURSE PRACTITIONER

## 2021-04-05 PROCEDURE — 36415 COLL VENOUS BLD VENIPUNCTURE: CPT

## 2021-04-05 PROCEDURE — 74011250636 HC RX REV CODE- 250/636: Performed by: NURSE PRACTITIONER

## 2021-04-05 RX ORDER — KETOROLAC TROMETHAMINE 30 MG/ML
15 INJECTION, SOLUTION INTRAMUSCULAR; INTRAVENOUS
Status: COMPLETED | OUTPATIENT
Start: 2021-04-05 | End: 2021-04-05

## 2021-04-05 RX ORDER — METHOCARBAMOL 750 MG/1
750 TABLET, FILM COATED ORAL
Status: COMPLETED | OUTPATIENT
Start: 2021-04-05 | End: 2021-04-05

## 2021-04-05 RX ADMIN — METHOCARBAMOL TABLETS 750 MG: 750 TABLET, COATED ORAL at 19:23

## 2021-04-05 RX ADMIN — KETOROLAC TROMETHAMINE 15 MG: 30 INJECTION, SOLUTION INTRAMUSCULAR at 19:23

## 2021-04-05 NOTE — ED TRIAGE NOTES
Pt reports upper back pain starting this past Friday and reports this morning her mid chest is also hurting and it \"hurts all the way through from her chest to her back when she takes a deep breath. \" Pt reports she was seen at two Urgent [facility] prior to coming to ED and was sent here for a cardiac work up and to be evaluated for a blood clot.

## 2021-04-05 NOTE — ED PROVIDER NOTES
This is a 70-year-old female with a past medical history of migraine headaches and depression who presents the ER today for evaluation of atraumatic back pain and chest pain. According to the patient, she started develop a sensation of right-sided back pain/stiffness on Friday of this past week which has since then has radiated to her right chest. She describes the pain as moderate in intensity and describes it as \"throbbing and tight\". Associated symptoms include a sensation of feeling close to pass out, but without any dizziness or actual syncope. Pain seems to be aggravated by sitting in the supine position and with taking deep breaths. She has not found any alleviating factors for her pain. She denies a history similar symptoms, and was seen at an urgent care facility and was referred here for further management. ROS negative for: Recent surgeries or pregnancies, palpitations, chest pressure, leg swelling, fever/chills, cough, shortness of breath, nausea, vomiting, dyspepsia. Past Medical History:   Diagnosis Date    Depression     Migraines     Postpartum depression     After second baby -mild- problems with mother in law- resolved       History reviewed. No pertinent surgical history.       Family History:   Problem Relation Age of Onset    Hypertension Paternal Grandmother     Heart Disease Mother     Hypertension Mother     Diabetes Mother     No Known Problems Father     Migraines Sister     Migraines Brother     No Known Problems Sister     No Known Problems Daughter     No Known Problems Daughter     No Known Problems Daughter     No Known Problems Daughter        Social History     Socioeconomic History    Marital status:      Spouse name: Not on file    Number of children: Not on file    Years of education: Not on file    Highest education level: Not on file   Occupational History    Occupation: Doesn't work outside her home   [address] resource strain: Not on file    Food insecurity     Worry: Not on file     Inability: Not on file    Transportation needs     Medical: Not on file     Non-medical: Not on file   Tobacco Use    Smoking status: Never Smoker    Smokeless tobacco: Never Used   Substance and Sexual Activity    Alcohol use: No    Drug use: No    Sexual activity: Yes     Partners: Male   Lifestyle    Physical activity     Days per week: Not on file     Minutes per session: Not on file    Stress: Not on file   Relationships    Social connections     Talks on phone: Not on file     Gets together: Not on file     Attends Bahai service: Not on file     Active member of club or organization: Not on file     Attends meetings of clubs or organizations: Not on file     Relationship status: Not on file    Intimate partner violence     Fear of current or ex partner: Not on file     Emotionally abused: Not on file     Physically abused: Not on file     Forced sexual activity: Not on file   Other Topics Concern    Not on file   Social History Narrative    Lives in [facility] with family         ALLERGIES: Patient has no known allergies. Review of Systems   Constitutional: Negative for fever. HENT: Negative for sore throat. Eyes: Negative for visual disturbance. Respiratory: Positive for chest tightness. Negative for cough, shortness of breath and wheezing. Cardiovascular: Positive for chest pain. Negative for palpitations and leg swelling. Gastrointestinal: Negative for abdominal pain, nausea and vomiting. Genitourinary: Negative for dysuria. Musculoskeletal: Negative for myalgias. Skin: Negative for rash. Neurological: Negative for dizziness, syncope and weakness. Psychiatric/Behavioral: Negative for dysphoric mood.        Vitals:    04/05/21 1847   BP: 137/83   Pulse: (!) 103   Resp: 19   Temp: 98.6 °F (37 °C)   SpO2: 97%   Weight: 130.6 kg (287 lb 14.7 oz)   Height: 5' 2\" (1.575 m)            Physical Exam  Vitals signs and nursing note reviewed. Constitutional:       General: She is not in acute distress. Appearance: Normal appearance. She is obese. She is not ill-appearing. HENT:      Head: Normocephalic and atraumatic. Right Ear: External ear normal.      Left Ear: External ear normal.      Nose: Nose normal.      Mouth/Throat:      Mouth: Mucous membranes are moist.      Pharynx: Oropharynx is clear. Eyes:      General: No scleral icterus. Extraocular Movements: Extraocular movements intact. Conjunctiva/sclera: Conjunctivae normal.      Pupils: Pupils are equal, round, and reactive to light. Neck:      Musculoskeletal: Normal range of motion and neck supple. No neck rigidity or muscular tenderness. Cardiovascular:      Rate and Rhythm: Regular rhythm. Tachycardia present. Pulses: Normal pulses. Radial pulses are 2+ on the right side and 2+ on the left side. Heart sounds: Normal heart sounds. No murmur. Pulmonary:      Effort: Tachypnea present. Breath sounds: Normal breath sounds and air entry. Comments: Slightly diminished lung sounds possibly due to body habitus  Chest:          Comments: Mild pain to palpation of chest wall - \"but the pain feels much deeper\"  Abdominal:      General: Bowel sounds are normal. There is no distension. Palpations: Abdomen is soft. Tenderness: There is no abdominal tenderness. There is no guarding. Musculoskeletal: Normal range of motion. Right lower leg: No edema. Left lower leg: No edema. Skin:     General: Skin is warm and dry. Coloration: Skin is not pale. Neurological:      General: No focal deficit present. Mental Status: She is alert and oriented to person, place, and time. Psychiatric:         Mood and Affect: Mood is anxious. Behavior: Behavior normal.         Thought Content:  Thought content normal.         Judgment: Judgment normal.          [facility]     VITAL SIGNS:  Patient Vitals for the past 4 hrs:   Temp Pulse Resp BP SpO2   04/05/21 1847 98.6 °F (37 °C) (!) 103 19 137/83 97 %         LABS:  Recent Results (from the past 6 hour(s))   EKG, 12 LEAD, INITIAL    Collection Time: 04/05/21  6:44 PM   Result Value Ref Range    Ventricular Rate 99 BPM    Atrial Rate 99 BPM    P-R Interval 172 ms    QRS Duration 84 ms    Q-T Interval 360 ms    QTC Calculation (Bezet) 462 ms    Calculated P Axis 27 degrees    Calculated R Axis 25 degrees    Calculated T Axis 18 degrees    Diagnosis       Normal sinus rhythm  Normal ECG  When compared with ECG of 25-MAR-2010 17:07,  No significant change was found     CBC WITH AUTOMATED DIFF    Collection Time: 04/05/21  6:57 PM   Result Value Ref Range    WBC 9.3 3.6 - 11.0 K/uL    RBC 4.21 3.80 - 5.20 M/uL    HGB 12.1 11.5 - 16.0 g/dL    HCT 37.8 35.0 - 47.0 %    MCV 89.8 80.0 - 99.0 FL    MCH 28.7 26.0 - 34.0 PG    MCHC 32.0 30.0 - 36.5 g/dL    RDW 12.3 11.5 - 14.5 %    PLATELET 126 310 - 877 K/uL    MPV 10.5 8.9 - 12.9 FL    NRBC 0.0 0  WBC    ABSOLUTE NRBC 0.00 0.00 - 0.01 K/uL    NEUTROPHILS 57 32 - 75 %    LYMPHOCYTES 37 12 - 49 %    MONOCYTES 6 5 - 13 %    EOSINOPHILS 0 0 - 7 %    BASOPHILS 0 0 - 1 %    IMMATURE GRANULOCYTES 0 0.0 - 0.5 %    ABS. NEUTROPHILS 5.2 1.8 - 8.0 K/UL    ABS. LYMPHOCYTES 3.4 0.8 - 3.5 K/UL    ABS. MONOCYTES 0.5 0.0 - 1.0 K/UL    ABS. EOSINOPHILS 0.0 0.0 - 0.4 K/UL    ABS. BASOPHILS 0.0 0.0 - 0.1 K/UL    ABS. IMM.  GRANS. 0.0 0.00 - 0.04 K/UL    DF AUTOMATED     METABOLIC PANEL, COMPREHENSIVE    Collection Time: 04/05/21  6:57 PM   Result Value Ref Range    Sodium 139 136 - 145 mmol/L    Potassium 3.8 3.5 - 5.1 mmol/L    Chloride 102 97 - 108 mmol/L    CO2 25 21 - 32 mmol/L    Anion gap 12 5 - 15 mmol/L    Glucose 82 65 - 100 mg/dL    BUN 11 6 - 20 MG/DL    Creatinine 0.72 0.55 - 1.02 MG/DL    BUN/Creatinine ratio 15 12 - 20      GFR est AA >60 >60 ml/min/1.73m2    GFR est non-AA >60 >60 ml/min/1.73m2 Calcium 9.2 8.5 - 10.1 MG/DL    Bilirubin, total 0.4 0.2 - 1.0 MG/DL    ALT (SGPT) 33 12 - 78 U/L    AST (SGOT) 16 15 - 37 U/L    Alk. phosphatase 48 45 - 117 U/L    Protein, total 7.3 6.4 - 8.2 g/dL    Albumin 3.6 3.5 - 5.0 g/dL    Globulin 3.7 2.0 - 4.0 g/dL    A-G Ratio 1.0 (L) 1.1 - 2.2     TROPONIN I    Collection Time: 04/05/21  6:57 PM   Result Value Ref Range    Troponin-I, Qt. <0.05 <0.05 ng/mL   D DIMER    Collection Time: 04/05/21  6:57 PM   Result Value Ref Range    D-dimer 0.24 0.00 - 0.65 mg/L FEU        IMAGING:  XR CHEST PA LAT   Final Result   Clear lungs. Medications During Visit:  Medications   ketorolac (TORADOL) injection 15 mg (15 mg IntraVENous Given 4/5/21 1923)   methocarbamoL (ROBAXIN) tablet 750 mg (750 mg Oral Given 4/5/21 1923)         DECISION MAKING:  Garrett Velasquez is a 32 y.o. female who comes in as above. D-dimer negative, EKG nonischemic, Trop negative. Clear chest x-ray. UA and pregnancy test pending. Signout given to Dr. Lucia Young at 1914 with plan to f/u on pending studies and treat for suspected MSK symptoms. IMPRESSION:  1. Chest pain, unspecified type    2. Acute right-sided thoracic back pain        DISPOSITION:  Pending      Current Discharge Medication List           Follow-up Information     Follow up With Specialties Details Why Contact Info    Your Primary Care Physician  Schedule an appointment as soon as possible for a visit               The patient is asked to follow-up with their primary care provider in the next several days. They are to call tomorrow for an appointment. The patient is asked to return promptly for any increased concerns or worsening of symptoms. They can return to this emergency department or any other emergency department.     Procedures

## 2021-04-06 LAB
ATRIAL RATE: 99 BPM
CALCULATED P AXIS, ECG09: 27 DEGREES
CALCULATED R AXIS, ECG10: 25 DEGREES
CALCULATED T AXIS, ECG11: 18 DEGREES
DIAGNOSIS, 93000: NORMAL
P-R INTERVAL, ECG05: 172 MS
Q-T INTERVAL, ECG07: 360 MS
QRS DURATION, ECG06: 84 MS
QTC CALCULATION (BEZET), ECG08: 462 MS
VENTRICULAR RATE, ECG03: 99 BPM

## 2021-11-23 ENCOUNTER — TELEPHONE (OUTPATIENT)
Dept: NEUROLOGY | Age: 31
End: 2021-11-23

## 2021-11-23 NOTE — TELEPHONE ENCOUNTER
Patient's  calling because his wife wants to get back on medication for her headaches. She is having lots of pain with frequent headaches.       If no answer on 's number try this number [phone]

## 2021-11-23 NOTE — TELEPHONE ENCOUNTER
Called Blake. [facility]. He states that pt is getting headaches every other day and is not taking any medication for the headaches. Asking for a refill. Inform Blake that pt has not been seen in office since 10/16/2020 and will need a f/u appt w/ Dr. Nishi Almonte.  Pt scheduled for 5/6/22 at 2:40pm.